# Patient Record
Sex: MALE | Race: WHITE | Employment: UNEMPLOYED | URBAN - METROPOLITAN AREA
[De-identification: names, ages, dates, MRNs, and addresses within clinical notes are randomized per-mention and may not be internally consistent; named-entity substitution may affect disease eponyms.]

---

## 2018-09-21 ENCOUNTER — OFFICE VISIT (OUTPATIENT)
Dept: FAMILY MEDICINE | Facility: CLINIC | Age: 48
End: 2018-09-21
Payer: COMMERCIAL

## 2018-09-21 ENCOUNTER — ALLIED HEALTH/NURSE VISIT (OUTPATIENT)
Dept: NURSING | Facility: CLINIC | Age: 48
End: 2018-09-21
Payer: COMMERCIAL

## 2018-09-21 VITALS
TEMPERATURE: 99.1 F | SYSTOLIC BLOOD PRESSURE: 121 MMHG | OXYGEN SATURATION: 95 % | HEART RATE: 90 BPM | DIASTOLIC BLOOD PRESSURE: 82 MMHG

## 2018-09-21 DIAGNOSIS — R50.9 FEVER, UNSPECIFIED FEVER CAUSE: ICD-10-CM

## 2018-09-21 DIAGNOSIS — R10.9 ABDOMINAL PAIN, UNSPECIFIED ABDOMINAL LOCATION: Primary | ICD-10-CM

## 2018-09-21 DIAGNOSIS — R10.12 ABDOMINAL PAIN, LEFT UPPER QUADRANT: Primary | ICD-10-CM

## 2018-09-21 PROCEDURE — 99213 OFFICE O/P EST LOW 20 MIN: CPT | Performed by: NURSE PRACTITIONER

## 2018-09-21 ASSESSMENT — PAIN SCALES - GENERAL: PAINLEVEL: MILD PAIN (3)

## 2018-09-21 NOTE — MR AVS SNAPSHOT
"              After Visit Summary   2018    Sudhir Morales    MRN: [id number]           Patient Information     Date Of Birth          1970        Visit Information        Provider Department      2018 9:20 AM Selina Brown APRN CNP [facility]        Today's Diagnoses     Abdominal pain, unspecified abdominal location    -  1    Fever, unspecified fever cause           Follow-ups after your visit        Who to contact     If you have questions or need follow up information about today's clinic visit or your schedule please contact [facility] directly at [phone].  Normal or non-critical lab and imaging results will be communicated to you by [employer], letter or phone within 4 business days after the clinic has received the results. If you do not hear from us within 7 days, please contact the clinic through [employer] or phone. If you have a critical or abnormal lab result, we will notify you by phone as soon as possible.  Submit refill requests through [employer] or call your pharmacy and they will forward the refill request to us. Please allow 3 business days for your refill to be completed.          Additional Information About Your Visit        MyChart Information     [employer] lets you send messages to your doctor, view your test results, renew your prescriptions, schedule appointments and more. To sign up, go to [email or web address] . Click on \"Log in\" on the left side of the screen, which will take you to the Welcome page. Then click on \"Sign up Now\" on the right side of the page.     You will be asked to enter the access code listed below, as well as some personal information. Please follow the directions to create your username and password.     Your access code is: [email or web address]  Expires: 2018  9:45 AM     Your access code will  in 90 days. If you need help or a new code, please call your [facility] or [phone].        Care " EveryWhere ID     This is your Care EveryWhere ID. This could be used by other organizations to access your [place] medical records  [id number]         Blood Pressure from Last 3 Encounters:   04/25/14 124/87   01/08/14 (!) 145/94   10/09/13 (!) 136/91    Weight from Last 3 Encounters:   04/25/14 (!) 313 lb (142 kg)   01/08/14 (!) 321 lb 12.8 oz (146 kg)   10/09/13 (!) 312 lb (141.5 kg)              Today, you had the following     No orders found for display       Primary Care Provider    Provider Outside       No address on file        Equal Access to Services     [facility]: Hadii aad garcia hadasho Soclarence, waaxda luqadaha, qaybta kaalmada adeegyada, stanley gan . So [facility] [phone].    ATENCIÓN: Si habla español, tiene a arora disposición servicios gratuitos de asistencia lingüística. Llame al [phone].    We comply with applicable federal civil rights laws and [place] laws. We do not discriminate on the basis of race, color, national origin, age, disability, sex, sexual orientation, or gender identity.            Thank you!     Thank you for choosing [facility]  for your care. Our goal is always to provide you with excellent care. Hearing back from our patients is one way we can continue to improve our services. Please take a few minutes to complete the written survey that you may receive in the mail after your visit with us. Thank you!             Your Updated Medication List - Protect others around you: Learn how to safely use, store and throw away your medicines at www.disposemymeds.org.          This list is accurate as of 9/21/18  9:45 AM.  Always use your most recent med list.                   Brand Name Dispense Instructions for use Diagnosis    * ACE/ARB/ARNI NOT PRESCRIBED (INTENTIONAL)      by Other route continuous prn.    Type 2 diabetes, HbA1c goal < 7% (H)       amphetamine-dextroamphetamine 30 MG per 24 hr capsule    ADDERALL XR    30  capsule    Take 1 capsule (30 mg) by mouth daily    Attention deficit disorder       * ASPIRIN NOT PRESCRIBED    INTENTIONAL    0    due to severe GI upset    Type II or unspecified type diabetes mellitus without mention of complication, not stated as uncontrolled       azelastine 0.05 % ophthalmic solution    OPTIVAR    1 Bottle    Place 1 drop into both eyes 2 times daily    Environmental allergies       azithromycin 250 MG tablet    ZITHROMAX    6 tablet    Take 2 tablets on day 1, then 1 tablet on days 2-5.    Streptococcal pharyngitis       betamethasone (augmented) 0.05 % lotion    DIPROLENE    60 mL    Apply sparingly to affected area.  Do not apply to face.    Seborrheic dermatitis of scalp       cetirizine 10 MG tablet    ZYRTEC    90 tablet    Take 1 tablet by mouth daily.    Allergic rhinitis, cause unspecified       meloxicam 15 MG tablet    MOBIC    30 tablet    Take 1 tablet (15 mg) by mouth daily    Back pain       metFORMIN 500 MG 24 hr tablet    GLUCOPHAGE-XR    360 tablet    Take 2 tablets (1,000 mg) by mouth 2 times daily (with meals)    Type 2 diabetes, HbA1c goal < 7% (H)       mometasone 50 MCG/ACT spray    NASONEX    1 Box    Spray 2 sprays into both nostrils daily    Allergic rhinitis, cause unspecified       montelukast 10 MG tablet    SINGULAIR    90 tablet    Take 1 tablet (10 mg) by mouth At Bedtime    Environmental allergies       * order for DME     300 each    LANCETS DAILY AND PRN    Type 2 diabetes, HbA1c goal < 7% (H)       * order for DME     300 each    TESTS THREE TIMES DAILY + PRN.  BRAND PER INSURANCE    Type 2 diabetes, HbA1c goal < 7% (H)       * order for DME     1 each    BRAND AS COVERED PER INSURANCE.    Type 2 diabetes, HbA1c goal < 7% (H)       sitagliptin 100 MG tablet    JANUVIA    90 tablet    Take 1 tablet (100 mg) by mouth daily    Type 2 diabetes, HbA1c goal < 7% (H)       * Notice:  This list has 5 medication(s) that are the same as other medications prescribed  for you. Read the directions carefully, and ask your doctor or other care provider to review them with you.

## 2018-09-21 NOTE — NURSING NOTE
"RN Triage:     Chief Complaint   Patient presents with     Abdominal Pain       Initial /82 (BP Location: Left arm, Patient Position: Supine, Cuff Size: Adult Large)  Pulse 90  Temp 99.1  F (37.3  C)  SpO2 95% Estimated body mass index is 39.12 kg/(m^2) as calculated from the following:    Height as of 4/25/14: 6' 3\" (1.905 m).    Weight as of 4/25/14: 313 lb (142 kg).  BP completed using cuff size: large    Assessment:  Patient reports he had colonoscopy on Wednesday which was 2 days ago. They removed a polyp. Later on in that  evening he developed cough, abdominal pain, fever, cough. Last night he was up most of the night coughing. Today he reports that he has SOB, and finds it difficult to get the breaths in a out so he feels he has chest tightness and SOB. He has not had a bowel movement since the colonoscopy. He reports that he feels over all not well. He reports abdominal pain rated 3/10 and is located in LUQ. He denies vomiting but states he has been nauseated.     Triage Dispo: Urgent to Emergent: Patient assessed to have immediate needs. Patient placed in clinic room and provider notified. immediately following nursing assessment RN huddled with provider and stated patient should be seen now and may need to go to emergency department.    Intervention: Selina Brown NP determined patient needed to go to ED.  RN escorted patient via wheel chair out to front of building and he ambulated and transferred himself into car where wife picked him up by car.       Concepcion Briones RN  "

## 2018-09-21 NOTE — MR AVS SNAPSHOT
"              After Visit Summary   2018    Sudhir Morales    MRN: [id number]           Patient Information     Date Of Birth          1970        Visit Information        Provider Department      2018 9:00 AM BK RN [facility]        Today's Diagnoses     Abdominal pain, left upper quadrant    -  1       Follow-ups after your visit        Who to contact     If you have questions or need follow up information about today's clinic visit or your schedule please contact [facility] directly at [phone].  Normal or non-critical lab and imaging results will be communicated to you by [email or web address], letter or phone within 4 business days after the clinic has received the results. If you do not hear from us within 7 days, please contact the clinic through [email or web address] or phone. If you have a critical or abnormal lab result, we will notify you by phone as soon as possible.  Submit refill requests through [employer] or call your pharmacy and they will forward the refill request to us. Please allow 3 business days for your refill to be completed.          Additional Information About Your Visit        MyChart Information     [employer] lets you send messages to your doctor, view your test results, renew your prescriptions, schedule appointments and more. To sign up, go to [email or web address] . Click on \"Log in\" on the left side of the screen, which will take you to the Welcome page. Then click on \"Sign up Now\" on the right side of the page.     You will be asked to enter the access code listed below, as well as some personal information. Please follow the directions to create your username and password.     Your access code is: [email or web address]  Expires: 2018  9:45 AM     Your access code will  in 90 days. If you need help or a new code, please call your [facility] or [phone].        Care EveryWhere ID     This is your Care EveryWhere ID. This could be used by other " organizations to access your [place] medical records  [id number]        Your Vitals Were     Pulse Temperature Pulse Oximetry             90 99.1  F (37.3  C) 95%          Blood Pressure from Last 3 Encounters:   09/21/18 121/82   04/25/14 124/87   01/08/14 (!) 145/94    Weight from Last 3 Encounters:   04/25/14 (!) 313 lb (142 kg)   01/08/14 (!) 321 lb 12.8 oz (146 kg)   10/09/13 (!) 312 lb (141.5 kg)              Today, you had the following     No orders found for display       Primary Care Provider    Provider Outside       No address on file        Equal Access to Services     [facility]: Hadii tigre Barraza, wakristin frank, qaybagustina kaalmada herb, stanley gan . So [facility] [phone].    ATENCIÓN: Si habla español, tiene a arora disposición servicios gratuitos de asistencia lingüística. Llame al [phone].    We comply with applicable federal civil rights laws and [place] laws. We do not discriminate on the basis of race, color, national origin, age, disability, sex, sexual orientation, or gender identity.            Thank you!     Thank you for choosing [facility]  for your care. Our goal is always to provide you with excellent care. Hearing back from our patients is one way we can continue to improve our services. Please take a few minutes to complete the written survey that you may receive in the mail after your visit with us. Thank you!             Your Updated Medication List - Protect others around you: Learn how to safely use, store and throw away your medicines at www.disposemymeds.org.          This list is accurate as of 9/21/18 10:00 AM.  Always use your most recent med list.                   Brand Name Dispense Instructions for use Diagnosis    * ACE/ARB/ARNI NOT PRESCRIBED (INTENTIONAL)      by Other route continuous prn.    Type 2 diabetes, HbA1c goal < 7% (H)       amphetamine-dextroamphetamine 30 MG per 24 hr capsule     ADDERALL XR    30 capsule    Take 1 capsule (30 mg) by mouth daily    Attention deficit disorder       * ASPIRIN NOT PRESCRIBED    INTENTIONAL    0    due to severe GI upset    Type II or unspecified type diabetes mellitus without mention of complication, not stated as uncontrolled       azelastine 0.05 % ophthalmic solution    OPTIVAR    1 Bottle    Place 1 drop into both eyes 2 times daily    Environmental allergies       azithromycin 250 MG tablet    ZITHROMAX    6 tablet    Take 2 tablets on day 1, then 1 tablet on days 2-5.    Streptococcal pharyngitis       betamethasone (augmented) 0.05 % lotion    DIPROLENE    60 mL    Apply sparingly to affected area.  Do not apply to face.    Seborrheic dermatitis of scalp       cetirizine 10 MG tablet    ZYRTEC    90 tablet    Take 1 tablet by mouth daily.    Allergic rhinitis, cause unspecified       meloxicam 15 MG tablet    MOBIC    30 tablet    Take 1 tablet (15 mg) by mouth daily    Back pain       metFORMIN 500 MG 24 hr tablet    GLUCOPHAGE-XR    360 tablet    Take 2 tablets (1,000 mg) by mouth 2 times daily (with meals)    Type 2 diabetes, HbA1c goal < 7% (H)       mometasone 50 MCG/ACT spray    NASONEX    1 Box    Spray 2 sprays into both nostrils daily    Allergic rhinitis, cause unspecified       montelukast 10 MG tablet    SINGULAIR    90 tablet    Take 1 tablet (10 mg) by mouth At Bedtime    Environmental allergies       * order for DME     300 each    LANCETS DAILY AND PRN    Type 2 diabetes, HbA1c goal < 7% (H)       * order for DME     300 each    TESTS THREE TIMES DAILY + PRN.  BRAND PER INSURANCE    Type 2 diabetes, HbA1c goal < 7% (H)       * order for DME     1 each    BRAND AS COVERED PER INSURANCE.    Type 2 diabetes, HbA1c goal < 7% (H)       sitagliptin 100 MG tablet    JANUVIA    90 tablet    Take 1 tablet (100 mg) by mouth daily    Type 2 diabetes, HbA1c goal < 7% (H)       * Notice:  This list has 5 medication(s) that are the same as other  medications prescribed for you. Read the directions carefully, and ask your doctor or other care provider to review them with you.

## 2018-09-21 NOTE — PROGRESS NOTES
SUBJECTIVE:   Sudhir Morales is a 48 year old male who presents to clinic today for the following health issues:      48 year old male presents with wife and young child with concerns for abdominal pain and fever x2 days. Tmax 101. He has some nausea and vomiting as well. He had a colonoscopy 2 days ago in [facility] and these symptoms started in the evening after he got home. No BM since before the colonoscopy. Decreased appetite. Headache and fatigue present. He has some low back pain. No change in bowel or bladder habits and no numbness or tingling. Also notes a cough and some mild shortness of breath. No chest pain. His primary care provider is at an outside hospital system.     Problem list and histories reviewed & adjusted, as indicated.  Additional history: as documented    Patient Active Problem List   Diagnosis     Allergic rhinitis     Family history of ischemic heart disease     High triglycerides     Overweight BMI >35     Thyroid nodule     Fatty liver     Splenomegaly     Hyperlipidemia LDL goal <100     Attention deficit disorder     JANNA (obstructive sleep apnea)     Back pain     Type 2 diabetes mellitus, uncontrolled (H)     Past Surgical History:   Procedure Laterality Date     C REPAIR OF NASAL SEPTUM  02/28/07    submucosal resection of the inferior turbinates     HC UGI ENDOSCOPY, SIMPLE EXAM  09/27/06     LITHOTRIPSY      large kidney stone     THYROID SURGERY  12/2010    lobectomy partial       Social History   Substance Use Topics     Smoking status: Former Smoker     Smokeless tobacco: Former User     Alcohol use Yes      Comment: Occassional     Family History   Problem Relation Age of Onset     C.A.D. Mother      MI     Diabetes Mother      Cancer Mother      HEART DISEASE Mother      C.A.D. Brother      MI - age 43     Diabetes Brother      Cancer Maternal Grandmother      Thyroid Disease Maternal Grandmother      Cardiovascular Maternal Grandmother      Diabetes Father      Cancer  Father      Cerebrovascular Disease Maternal Grandfather      Cancer - colorectal Paternal Grandmother      Cancer - colorectal Paternal Grandfather          Current Outpatient Prescriptions   Medication Sig Dispense Refill     ACE/ARB NOT PRESCRIBED, INTENTIONAL, by Other route continuous prn.       amphetamine-dextroamphetamine (ADDERALL XR) 30 MG per capsule Take 1 capsule (30 mg) by mouth daily 30 capsule 0     ASPIRIN NOT PRESCRIBED (INTENTIONAL) due to severe GI upset 0 0     azelastine (OPTIVAR) 0.05 % SOLN Place 1 drop into both eyes 2 times daily 1 Bottle 11     azithromycin (ZITHROMAX) 250 MG tablet Take 2 tablets on day 1, then 1 tablet on days 2-5. 6 tablet 0     betamethasone, augmented, (DIPROLENE) 0.05 % lotion Apply sparingly to affected area.  Do not apply to face. 60 mL 1     cetirizine (ZYRTEC) 10 MG tablet Take 1 tablet by mouth daily. 90 tablet 3     meloxicam (MOBIC) 15 MG tablet Take 1 tablet (15 mg) by mouth daily 30 tablet 1     metFORMIN (GLUCOPHAGE-XR) 500 MG 24 hr tablet Take 2 tablets (1,000 mg) by mouth 2 times daily (with meals) 360 tablet 3     mometasone (NASONEX) 50 MCG/ACT nasal spray Spray 2 sprays into both nostrils daily 1 Box 3     montelukast (SINGULAIR) 10 MG tablet Take 1 tablet (10 mg) by mouth At Bedtime 90 tablet 1     ORDER FOR DME LANCETS DAILY AND  each 4     ORDER FOR DME TESTS THREE TIMES DAILY + PRN.  BRAND PER INSURANCE 300 each 4     ORDER FOR DME BRAND AS COVERED PER INSURANCE. 1 each 0     sitagliptan (JANUVIA) 100 MG tablet Take 1 tablet (100 mg) by mouth daily 90 tablet 3     No Known Allergies    Reviewed and updated as needed this visit by clinical staff  Problems       Reviewed and updated as needed this visit by Provider  Problems         ROS:  Constitutional, HEENT, cardiovascular, pulmonary, gi and gu systems are negative, except as otherwise noted.    OBJECTIVE:     There were no vitals taken for this visit.  There is no height or weight on  file to calculate BMI.   See RN note for vitals as they are not flowing into my note. All WNL.    GENERAL: alert and no apparent distress. Lying on exam room table. He was pushed in wheelchair into room by RN.  ABDOMEN: generalized abdominal tenderness  PSYCH: mentation appears normal, affect normal/bright    Diagnostic Test Results:  none     ASSESSMENT/PLAN:         ICD-10-CM    1. Abdominal pain, unspecified abdominal location R10.9    2. Fever, unspecified fever cause R50.9      Symptoms severity of symptoms and that they started after colonoscopy and worsening, referred patient to emergency department for further workup. Patient prefers to go to [facility]. I did explain that if they find something abnormal, there is a chance they will have to transfer him to [facility]. Both he and his wife verbalize understanding. He is safe for private transport to the emergency department at this time. They will go directly there.    Patient verbalizes understanding and agrees with plan of care. Patient stable for discharge.      HUDSON [facility]

## 2018-11-11 ENCOUNTER — OFFICE VISIT (OUTPATIENT)
Dept: URGENT CARE | Facility: URGENT CARE | Age: 48
End: 2018-11-11
Payer: COMMERCIAL

## 2018-11-11 VITALS
WEIGHT: 285.4 LBS | DIASTOLIC BLOOD PRESSURE: 92 MMHG | HEIGHT: 75 IN | SYSTOLIC BLOOD PRESSURE: 144 MMHG | RESPIRATION RATE: 16 BRPM | HEART RATE: 72 BPM | OXYGEN SATURATION: 96 % | BODY MASS INDEX: 35.49 KG/M2 | TEMPERATURE: 97.6 F

## 2018-11-11 DIAGNOSIS — G51.0 LEFT-SIDED BELL'S PALSY: Primary | ICD-10-CM

## 2018-11-11 PROCEDURE — 99213 OFFICE O/P EST LOW 20 MIN: CPT | Performed by: FAMILY MEDICINE

## 2018-11-11 RX ORDER — VALACYCLOVIR HYDROCHLORIDE 1 G/1
1000 TABLET, FILM COATED ORAL 3 TIMES DAILY
Qty: 21 TABLET | Refills: 0 | Status: SHIPPED | OUTPATIENT
Start: 2018-11-11

## 2018-11-11 RX ORDER — PREDNISONE 20 MG/1
40 TABLET ORAL DAILY
Qty: 14 TABLET | Refills: 0 | Status: SHIPPED | OUTPATIENT
Start: 2018-11-11 | End: 2018-11-18

## 2018-11-11 ASSESSMENT — PAIN SCALES - GENERAL: PAINLEVEL: MILD PAIN (3)

## 2018-11-11 NOTE — MR AVS SNAPSHOT
After Visit Summary   11/11/2018    Sudhir Morales    MRN: [id number]           Patient Information     Date Of Birth          1970        Visit Information        Provider Department      11/11/2018 10:25 AM Jose Jorge MD [facility]        Today's Diagnoses     Left-sided Bell's palsy    -  1      Care Instructions      Bell s Palsy  Bell s palsy is a nerve disorder that usually happens suddenly and without warning. This condition happens when a nerve that controls facial movement is swollen, inflamed, or compressed. Nerve damage can happen for many reasons. But most cases of Bell s palsy are probably caused by a virus.  Symptoms of Bell s palsy  Here are signs of the disorder:     Weakness, twitching, or total paralysis of one side of your face (in rare cases, on both sides)    Drooping of the eyelid and mouth, drooling on one side of mouth    Trouble closing one eye completely, and excessive tearing    Noises seeming louder than usual    Ringing in one or both ears    Change in your sense of taste  When to go to the emergency room (ER)  There are conditions, such as stroke, that may look like Bell's palsy and are medical emergencies. Therefore, you should seek emergent medical care if you notice facial weakness or drooping. Although Bell s palsy can be alarming, it s rarely serious. Many people begin to improve in about 2 weeks, even without treatment. It is important to be seen as soon as possible. Most research shows that treatment is best when received within the first few days of symptoms.   Treatment  To treat Bell s palsy, you may be given steroid medicines. This helps reduce swelling of the affected nerve. In some cases, your healthcare provider may prescribe an antiviral medicine. Your open eye may be covered with a patch to prevent it from drying out. You also may need to use eye drops and ointments for a time. Physical therapy, facial massage, or acupuncture  may also be prescribed. Your healthcare provider will discuss follow-up care with you, including the possible need for further treatment to help your facial muscles return to normal.  Date Last Reviewed: 3/1/2018    4491-4412 [employer]. [address] [place] [address], [place] [address]. All rights reserved. This information is not intended as a substitute for professional medical care. Always follow your healthcare professional's instructions.        Cotto s Palsy    Bell's Palsy is a problem involving the nerve that controls the muscles on one side of the face.  In most cases, the cause is unknown, but may be related to inflammation of the nerve, diabetes, pregnancy, Lyme disease, and viral infections such as herpes or varicella. Symptoms usually appear only on one side. They may include:    Inability to close the eyelid    Tearing of the eye    Facial drooping    Drooling    Numbness or pain    Changes in taste    Sound sensitivity  Damage to the eye can be a serious problem. The inability to blink can cause the eye to dry out. An ulcer (sore) can then form on the cornea. Also, not blinking means that the eye has no protection from dirt and dust particles.  Treatment involves protecting and moistening the eye. Medicines, such as steroids, may also help.  Most people recover fully within 3 to 6 months. However, the condition sometimes returns months or years later.  Home care    Get plenty of rest and eat a healthy diet to help yourself recover.    Use artificial tears often during the day and at bedtime to prevent drying. These drops are available without prescription at your drug store.    Wear protective glasses especially when outside to protect from flying debris. Use sunglasses when outdoors.    Tape the eyelid closed at bedtime with a paper tape (available at your pharmacy). It has a very mild adhesive so won't injure the lid. This will protect your eye from injury while you sleep.    Sometimes  medicines are prescribed to reduce inflammation or treat specific viral infections of the nerve. If medicines are prescribed, take them exactly as directed. Usually the sooner the medicines are started, the more effective they are. Taking this medicine as prescribed will help with a full recovery.    Use low heat, for example from a heating pad, on the affected area. This can help reduce pain and swelling.    If you have severe pain, contact your healthcare provider.  Follow-up care  Follow up with your healthcare provider as advised. If you referred to a specialist, make that appointment promptly.  When to seek medical advice  Call your healthcare provider if any of the following occur:    Severe eye redness    Eye pain    Thick drainage from the eye    Change in vision (such as double vision or losing vision)    Fever over 100.4 F (38 C) or as directed by your healthcare provider    Headache, neck pain, weakness, trouble speaking or walking, or other unexplained symptoms  Date Last Reviewed: 3/1/2018    9738-1608 [employer]. [address] [place] [address]. All rights reserved. This information is not intended as a substitute for professional medical care. Always follow your healthcare professional's instructions.                Follow-ups after your visit        Additional Services     NEUROLOGY ADULT REFERRAL       Your provider has referred you for the following:   Consult at [facility]: Neurology Clinic - [place] [phone]   [email or web address]     Please be aware that coverage of these services is subject to the terms and limitations of your health insurance plan.  Call member services at your health plan with any benefit or coverage questions.      Please bring the following with you to your appointment:    (1) Any X-Rays, CTs or MRIs which have been performed.  Contact the facility where they were done to arrange for  prior to your  "scheduled appointment.    (2) List of current medications  (3) This referral request   (4) Any documents/labs given to you for this referral                  Who to contact     If you have questions or need follow up information about today's clinic visit or your schedule please contact [facility] directly at [phone].  Normal or non-critical lab and imaging results will be communicated to you by MyChart, letter or phone within 4 business days after the clinic has received the results. If you do not hear from us within 7 days, please contact the clinic through [employer] or phone. If you have a critical or abnormal lab result, we will notify you by phone as soon as possible.  Submit refill requests through [employer] or call your pharmacy and they will forward the refill request to us. Please allow 3 business days for your refill to be completed.          Additional Information About Your Visit        MyChart Information     [employer] lets you send messages to your doctor, view your test results, renew your prescriptions, schedule appointments and more. To sign up, go to [email or web address] . Click on \"Log in\" on the left side of the screen, which will take you to the Welcome page. Then click on \"Sign up Now\" on the right side of the page.     You will be asked to enter the access code listed below, as well as some personal information. Please follow the directions to create your username and password.     Your access code is: [email or web address]  Expires: 2018  8:45 AM     Your access code will  in 90 days. If you need help or a new code, please call your [facility] or [phone].        Care EveryWhere ID     This is your Care EveryWhere ID. This could be used by other organizations to access your [facility] medical records  [id number]        Your Vitals Were     Pulse Temperature Respirations Height Pulse Oximetry BMI (Body Mass Index)    72 97.6  F (36.4  C) (Oral) 16 6' 3\" (1.905 " m) 96% 35.67 kg/m2       Blood Pressure from Last 3 Encounters:   11/11/18 (!) 144/92   09/21/18 121/82   04/25/14 124/87    Weight from Last 3 Encounters:   11/11/18 285 lb 6.4 oz (129.5 kg)   04/25/14 (!) 313 lb (142 kg)   01/08/14 (!) 321 lb 12.8 oz (146 kg)              We Performed the Following     NEUROLOGY ADULT REFERRAL          Today's Medication Changes          These changes are accurate as of 11/11/18 11:34 AM.  If you have any questions, ask your nurse or doctor.               Start taking these medicines.        Dose/Directions    predniSONE 20 MG tablet   Commonly known as:  DELTASONE   Used for:  Left-sided Bell's palsy   Started by:  Jose Jorge MD        Dose:  40 mg   Take 2 tablets (40 mg) by mouth daily for 7 days   Quantity:  14 tablet   Refills:  0       valACYclovir 1000 mg tablet   Commonly known as:  VALTREX   Used for:  Left-sided Bell's palsy   Started by:  Jose Jorge MD        Dose:  1000 mg   Take 1 tablet (1,000 mg) by mouth 3 times daily   Quantity:  21 tablet   Refills:  0            Where to get your medicines      These medications were sent to [facility] [address] [place] [address] - [address] AT [facility] [address], [facility] [address]     Phone:  [phone]     predniSONE 20 MG tablet    valACYclovir 1000 mg tablet                Primary Care Provider    Provider Outside       No address on file        Equal Access to Services     SUZIE AVILA AH: Hadii tigre ku hadasho Sochristopherali, waaxda luqadaha, qaybta kaalmada adeegyada, waxay long jean-baptiste. So [facility] [phone].    ATENCIÓN: Si habla terriañol, tiene a arora disposición servicios gratuitos de asistencia lingüística. Llame al [phone].    We comply with applicable federal civil rights laws and [place] laws. We do not discriminate on the basis of race, color, national origin, age, disability, sex, sexual orientation, or gender identity.             Thank you!     Thank you for choosing [facility]  for your care. Our goal is always to provide you with excellent care. Hearing back from our patients is one way we can continue to improve our services. Please take a few minutes to complete the written survey that you may receive in the mail after your visit with us. Thank you!             Your Updated Medication List - Protect others around you: Learn how to safely use, store and throw away your medicines at www.disposemymeds.org.          This list is accurate as of 11/11/18 11:34 AM.  Always use your most recent med list.                   Brand Name Dispense Instructions for use Diagnosis    * ACE/ARB/ARNI NOT PRESCRIBED (INTENTIONAL)      by Other route continuous prn.    Type 2 diabetes, HbA1c goal < 7% (H)       amphetamine-dextroamphetamine 30 MG per 24 hr capsule    ADDERALL XR    30 capsule    Take 1 capsule (30 mg) by mouth daily    Attention deficit disorder       * ASPIRIN NOT PRESCRIBED    INTENTIONAL    0    due to severe GI upset    Type II or unspecified type diabetes mellitus without mention of complication, not stated as uncontrolled       azelastine 0.05 % ophthalmic solution    OPTIVAR    1 Bottle    Place 1 drop into both eyes 2 times daily    Environmental allergies       betamethasone (augmented) 0.05 % lotion    DIPROLENE    60 mL    Apply sparingly to affected area.  Do not apply to face.    Seborrheic dermatitis of scalp       cetirizine 10 MG tablet    ZYRTEC    90 tablet    Take 1 tablet by mouth daily.    Allergic rhinitis, cause unspecified       meloxicam 15 MG tablet    MOBIC    30 tablet    Take 1 tablet (15 mg) by mouth daily    Back pain       metFORMIN 500 MG 24 hr tablet    GLUCOPHAGE-XR    360 tablet    Take 2 tablets (1,000 mg) by mouth 2 times daily (with meals)    Type 2 diabetes, HbA1c goal < 7% (H)       mometasone 50 MCG/ACT spray    NASONEX    1 Box    Spray 2 sprays into both nostrils daily     Allergic rhinitis, cause unspecified       montelukast 10 MG tablet    SINGULAIR    90 tablet    Take 1 tablet (10 mg) by mouth At Bedtime    Environmental allergies       * order for DME     300 each    LANCETS DAILY AND PRN    Type 2 diabetes, HbA1c goal < 7% (H)       * order for DME     300 each    TESTS THREE TIMES DAILY + PRN.  BRAND PER INSURANCE    Type 2 diabetes, HbA1c goal < 7% (H)       * order for DME     1 each    BRAND AS COVERED PER INSURANCE.    Type 2 diabetes, HbA1c goal < 7% (H)       predniSONE 20 MG tablet    DELTASONE    14 tablet    Take 2 tablets (40 mg) by mouth daily for 7 days    Left-sided Bell's palsy       sitagliptin 100 MG tablet    JANUVIA    90 tablet    Take 1 tablet (100 mg) by mouth daily    Type 2 diabetes, HbA1c goal < 7% (H)       valACYclovir 1000 mg tablet    VALTREX    21 tablet    Take 1 tablet (1,000 mg) by mouth 3 times daily    Left-sided Bell's palsy       * Notice:  This list has 5 medication(s) that are the same as other medications prescribed for you. Read the directions carefully, and ask your doctor or other care provider to review them with you.

## 2018-11-11 NOTE — PROGRESS NOTES
SUBJECTIVE:   Sudhir Morales is a 48 year old male who presents to clinic today for the following health issues:    Chief Complaint   Patient presents with     Eye Problem       Duration: 1 day     Description (location/character/radiation): left eye red, drainage     Intensity:  moderate    Accompanying signs and symptoms: none     History (similar episodes/previous evaluation): None    Precipitating or alleviating factors: None    Therapies tried and outcome: eye wash        Problem list and histories reviewed & adjusted, as indicated.  Additional history: as documented    Patient Active Problem List   Diagnosis     Allergic rhinitis     Family history of ischemic heart disease     High triglycerides     Overweight BMI >35     Thyroid nodule     Fatty liver     Splenomegaly     Hyperlipidemia LDL goal <100     Attention deficit disorder     JANNA (obstructive sleep apnea)     Back pain     Type 2 diabetes mellitus, uncontrolled (H)     Past Surgical History:   Procedure Laterality Date      REPAIR OF NASAL SEPTUM  02/28/07    submucosal resection of the inferior turbinates      UGI ENDOSCOPY, SIMPLE EXAM  09/27/06     LITHOTRIPSY      large kidney stone     THYROID SURGERY  12/2010    lobectomy partial       Social History   Substance Use Topics     Smoking status: Former Smoker     Smokeless tobacco: Former User     Alcohol use Yes      Comment: Occassional     Family History   Problem Relation Age of Onset     C.A.D. Mother      MI     Diabetes Mother      Cancer Mother      HEART DISEASE Mother      C.A.D. Brother      MI - age 43     Diabetes Brother      Cancer Maternal Grandmother      Thyroid Disease Maternal Grandmother      Cardiovascular Maternal Grandmother      Diabetes Father      Cancer Father      Cerebrovascular Disease Maternal Grandfather      Cancer - colorectal Paternal Grandmother      Cancer - colorectal Paternal Grandfather          Current Outpatient Prescriptions   Medication Sig  Dispense Refill     ACE/ARB NOT PRESCRIBED, INTENTIONAL, by Other route continuous prn.       amphetamine-dextroamphetamine (ADDERALL XR) 30 MG per capsule Take 1 capsule (30 mg) by mouth daily 30 capsule 0     ASPIRIN NOT PRESCRIBED (INTENTIONAL) due to severe GI upset 0 0     azelastine (OPTIVAR) 0.05 % SOLN Place 1 drop into both eyes 2 times daily 1 Bottle 11     betamethasone, augmented, (DIPROLENE) 0.05 % lotion Apply sparingly to affected area.  Do not apply to face. 60 mL 1     cetirizine (ZYRTEC) 10 MG tablet Take 1 tablet by mouth daily. 90 tablet 3     meloxicam (MOBIC) 15 MG tablet Take 1 tablet (15 mg) by mouth daily 30 tablet 1     metFORMIN (GLUCOPHAGE-XR) 500 MG 24 hr tablet Take 2 tablets (1,000 mg) by mouth 2 times daily (with meals) 360 tablet 3     mometasone (NASONEX) 50 MCG/ACT nasal spray Spray 2 sprays into both nostrils daily 1 Box 3     montelukast (SINGULAIR) 10 MG tablet Take 1 tablet (10 mg) by mouth At Bedtime 90 tablet 1     ORDER FOR DME LANCETS DAILY AND  each 4     ORDER FOR DME TESTS THREE TIMES DAILY + PRN.  BRAND PER INSURANCE 300 each 4     ORDER FOR DME BRAND AS COVERED PER INSURANCE. 1 each 0     sitagliptan (JANUVIA) 100 MG tablet Take 1 tablet (100 mg) by mouth daily 90 tablet 3     No Known Allergies  Recent Labs   Lab Test  04/25/14   1239  10/09/13   1513  06/13/13   1230  02/12/13   1115  05/01/12   0932   01/18/11   0724  12/11/10   0623   10/26/10   1045   A1C  9.4*  7.3*  9.7*  9.3*  7.3*   < >   --    --    --   8.0*   LDL  Cannot estimate LDL when triglyceride exceeds 400 mg/dL  78   --    --   Cannot estimate LDL when triglyceride exceeds 400 mg/dL  Cannot estimate LDL when triglyceride exceeds 400 mg/dL   --   90   --    < >   --    HDL  30*   --    --   37*  32*   --   37*   --    < >   --    TRIG  455*   --    --   545*  540*   --   187*   --    < >   --    ALT  91*   --    --    --   40   --   41   --    --   102*   CR  1.07   --    --   0.96  1.23   <  ">   --   1.20   --    --    GFRESTIMATED  75   --    --   86  65   < >   --   67   --    --    GFRESTBLACK  >90   --    --   >90  78   < >   --   81   --    --    POTASSIUM  4.6   --    --    --    --    --    --   4.1   --    --    TSH   --    --    --   2.30   --    --    --    --    --   3.39    < > = values in this interval not displayed.      BP Readings from Last 3 Encounters:   11/11/18 (!) 144/92   09/21/18 121/82   04/25/14 124/87    Wt Readings from Last 3 Encounters:   11/11/18 285 lb 6.4 oz (129.5 kg)   04/25/14 (!) 313 lb (142 kg)   01/08/14 (!) 321 lb 12.8 oz (146 kg)                  Labs reviewed in EPIC    Reviewed and updated as needed this visit by clinical staff  Tobacco  Allergies  Meds  Med Hx  Surg Hx  Fam Hx  Soc Hx      Reviewed and updated as needed this visit by Provider         ROS:  Constitutional, HEENT, cardiovascular, pulmonary, GI, , musculoskeletal, neuro, skin, endocrine and psych systems are negative, except as otherwise noted.    OBJECTIVE:     BP (!) 144/92 (BP Location: Right arm, Patient Position: Sitting, Cuff Size: Adult Large)  Pulse 72  Temp 97.6  F (36.4  C) (Oral)  Resp 16  Ht 6' 3\" (1.905 m)  Wt 285 lb 6.4 oz (129.5 kg)  SpO2 96%  BMI 35.67 kg/m2  Body mass index is 35.67 kg/(m^2).  GENERAL: alert, no distress and obese  EYES: unable to close left eye completely, pupil equal and reactive to light and accommodation, no conjunctival injection or drainage noted  HENT: ear canals and TM's normal, nose and mouth without ulcers or lesions  NECK: no adenopathy, no asymmetry, masses, or scars and thyroid normal to palpation  RESP: lungs clear to auscultation - no rales, rhonchi or wheezes  CV: regular rate and rhythm, normal S1 S2, no S3 or S4, no murmur, click or rub, no peripheral edema and peripheral pulses strong  ABDOMEN: soft, nontender, no hepatosplenomegaly, no masses and bowel sounds normal  MS: no gross musculoskeletal defects noted, no edema  SKIN: no " suspicious lesions or rashes  NEURO: subtle left sided facial drop/loss of nasolabial fold, loss of left sided forehead wrinkle, normal upper and lower extremity strength, reflexes 2+, sensation to touch impression intact    ASSESSMENT/PLAN:         ICD-10-CM    1. Left-sided Bell's palsy G51.0 NEUROLOGY ADULT REFERRAL     valACYclovir (VALTREX) 1000 mg tablet     predniSONE (DELTASONE) 20 MG tablet     Suspect presentation secondary to left-sided Bell's palsy, differentials and pathogenesis discussed in detail.  Valacyclovir and prednisone prescribed, common side effects discussed.  Suggested lubricating ophthalmic drops.  Neurology referral placed for further review and recommendations.  Instructed to go ER if symptoms worsen.  Patient understood and in agreement with above plan.  All questions answered.        Patient Instructions     Bell s Palsy  Bell s palsy is a nerve disorder that usually happens suddenly and without warning. This condition happens when a nerve that controls facial movement is swollen, inflamed, or compressed. Nerve damage can happen for many reasons. But most cases of Bell s palsy are probably caused by a virus.  Symptoms of Bell s palsy  Here are signs of the disorder:     Weakness, twitching, or total paralysis of one side of your face (in rare cases, on both sides)    Drooping of the eyelid and mouth, drooling on one side of mouth    Trouble closing one eye completely, and excessive tearing    Noises seeming louder than usual    Ringing in one or both ears    Change in your sense of taste  When to go to the emergency room (ER)  There are conditions, such as stroke, that may look like Bell's palsy and are medical emergencies. Therefore, you should seek emergent medical care if you notice facial weakness or drooping. Although Bell s palsy can be alarming, it s rarely serious. Many people begin to improve in about 2 weeks, even without treatment. It is important to be seen as soon as  possible. Most research shows that treatment is best when received within the first few days of symptoms.   Treatment  To treat Bell s palsy, you may be given steroid medicines. This helps reduce swelling of the affected nerve. In some cases, your healthcare provider may prescribe an antiviral medicine. Your open eye may be covered with a patch to prevent it from drying out. You also may need to use eye drops and ointments for a time. Physical therapy, facial massage, or acupuncture may also be prescribed. Your healthcare provider will discuss follow-up care with you, including the possible need for further treatment to help your facial muscles return to normal.  Date Last Reviewed: 3/1/2018    9812-4666 [employer]. [address] [place] [address], [place] [address]. All rights reserved. This information is not intended as a substitute for professional medical care. Always follow your healthcare professional's instructions.        Cotto s Palsy    Bell's Palsy is a problem involving the nerve that controls the muscles on one side of the face.  In most cases, the cause is unknown, but may be related to inflammation of the nerve, diabetes, pregnancy, Lyme disease, and viral infections such as herpes or varicella. Symptoms usually appear only on one side. They may include:    Inability to close the eyelid    Tearing of the eye    Facial drooping    Drooling    Numbness or pain    Changes in taste    Sound sensitivity  Damage to the eye can be a serious problem. The inability to blink can cause the eye to dry out. An ulcer (sore) can then form on the cornea. Also, not blinking means that the eye has no protection from dirt and dust particles.  Treatment involves protecting and moistening the eye. Medicines, such as steroids, may also help.  Most people recover fully within 3 to 6 months. However, the condition sometimes returns months or years later.  Home care    Get plenty of rest and eat a healthy diet to help  yourself recover.    Use artificial tears often during the day and at bedtime to prevent drying. These drops are available without prescription at your drug store.    Wear protective glasses especially when outside to protect from flying debris. Use sunglasses when outdoors.    Tape the eyelid closed at bedtime with a paper tape (available at your pharmacy). It has a very mild adhesive so won't injure the lid. This will protect your eye from injury while you sleep.    Sometimes medicines are prescribed to reduce inflammation or treat specific viral infections of the nerve. If medicines are prescribed, take them exactly as directed. Usually the sooner the medicines are started, the more effective they are. Taking this medicine as prescribed will help with a full recovery.    Use low heat, for example from a heating pad, on the affected area. This can help reduce pain and swelling.    If you have severe pain, contact your healthcare provider.  Follow-up care  Follow up with your healthcare provider as advised. If you referred to a specialist, make that appointment promptly.  When to seek medical advice  Call your healthcare provider if any of the following occur:    Severe eye redness    Eye pain    Thick drainage from the eye    Change in vision (such as double vision or losing vision)    Fever over 100.4 F (38 C) or as directed by your healthcare provider    Headache, neck pain, weakness, trouble speaking or walking, or other unexplained symptoms  Date Last Reviewed: 3/1/2018    2690-6746 [employer]. [address] [place] [address]. All rights reserved. This information is not intended as a substitute for professional medical care. Always follow your healthcare professional's instructions.            oJse Jorge MD  [facility]

## 2018-11-11 NOTE — PATIENT INSTRUCTIONS
Bell s Palsy  Bell s palsy is a nerve disorder that usually happens suddenly and without warning. This condition happens when a nerve that controls facial movement is swollen, inflamed, or compressed. Nerve damage can happen for many reasons. But most cases of Bell s palsy are probably caused by a virus.  Symptoms of Bell s palsy  Here are signs of the disorder:     Weakness, twitching, or total paralysis of one side of your face (in rare cases, on both sides)    Drooping of the eyelid and mouth, drooling on one side of mouth    Trouble closing one eye completely, and excessive tearing    Noises seeming louder than usual    Ringing in one or both ears    Change in your sense of taste  When to go to the emergency room (ER)  There are conditions, such as stroke, that may look like Bell's palsy and are medical emergencies. Therefore, you should seek emergent medical care if you notice facial weakness or drooping. Although Bell s palsy can be alarming, it s rarely serious. Many people begin to improve in about 2 weeks, even without treatment. It is important to be seen as soon as possible. Most research shows that treatment is best when received within the first few days of symptoms.   Treatment  To treat Bell s palsy, you may be given steroid medicines. This helps reduce swelling of the affected nerve. In some cases, your healthcare provider may prescribe an antiviral medicine. Your open eye may be covered with a patch to prevent it from drying out. You also may need to use eye drops and ointments for a time. Physical therapy, facial massage, or acupuncture may also be prescribed. Your healthcare provider will discuss follow-up care with you, including the possible need for further treatment to help your facial muscles return to normal.  Date Last Reviewed: 3/1/2018    4308-9719 [employer]. [address] [place] [address], [place] [address]. All rights reserved. This information is not intended as a substitute  for professional medical care. Always follow your healthcare professional's instructions.        Cotto s Palsy    Bell's Palsy is a problem involving the nerve that controls the muscles on one side of the face.  In most cases, the cause is unknown, but may be related to inflammation of the nerve, diabetes, pregnancy, Lyme disease, and viral infections such as herpes or varicella. Symptoms usually appear only on one side. They may include:    Inability to close the eyelid    Tearing of the eye    Facial drooping    Drooling    Numbness or pain    Changes in taste    Sound sensitivity  Damage to the eye can be a serious problem. The inability to blink can cause the eye to dry out. An ulcer (sore) can then form on the cornea. Also, not blinking means that the eye has no protection from dirt and dust particles.  Treatment involves protecting and moistening the eye. Medicines, such as steroids, may also help.  Most people recover fully within 3 to 6 months. However, the condition sometimes returns months or years later.  Home care    Get plenty of rest and eat a healthy diet to help yourself recover.    Use artificial tears often during the day and at bedtime to prevent drying. These drops are available without prescription at your drug store.    Wear protective glasses especially when outside to protect from flying debris. Use sunglasses when outdoors.    Tape the eyelid closed at bedtime with a paper tape (available at your pharmacy). It has a very mild adhesive so won't injure the lid. This will protect your eye from injury while you sleep.    Sometimes medicines are prescribed to reduce inflammation or treat specific viral infections of the nerve. If medicines are prescribed, take them exactly as directed. Usually the sooner the medicines are started, the more effective they are. Taking this medicine as prescribed will help with a full recovery.    Use low heat, for example from a heating pad, on the affected area.  This can help reduce pain and swelling.    If you have severe pain, contact your healthcare provider.  Follow-up care  Follow up with your healthcare provider as advised. If you referred to a specialist, make that appointment promptly.  When to seek medical advice  Call your healthcare provider if any of the following occur:    Severe eye redness    Eye pain    Thick drainage from the eye    Change in vision (such as double vision or losing vision)    Fever over 100.4 F (38 C) or as directed by your healthcare provider    Headache, neck pain, weakness, trouble speaking or walking, or other unexplained symptoms  Date Last Reviewed: 3/1/2018    [id number] [employer]. [address] [place] [address], [place] [address]. All rights reserved. This information is not intended as a substitute for professional medical care. Always follow your healthcare professional's instructions.

## 2018-11-13 ENCOUNTER — TRANSFERRED RECORDS (OUTPATIENT)
Dept: HEALTH INFORMATION MANAGEMENT | Facility: CLINIC | Age: 48
End: 2018-11-13

## 2020-08-24 ENCOUNTER — OFFICE VISIT (OUTPATIENT)
Dept: URGENT CARE | Facility: URGENT CARE | Age: 50
End: 2020-08-24
Payer: COMMERCIAL

## 2020-08-24 VITALS
SYSTOLIC BLOOD PRESSURE: 174 MMHG | WEIGHT: 299.4 LBS | HEART RATE: 77 BPM | TEMPERATURE: 98.4 F | DIASTOLIC BLOOD PRESSURE: 96 MMHG | OXYGEN SATURATION: 98 % | BODY MASS INDEX: 37.42 KG/M2 | RESPIRATION RATE: 12 BRPM

## 2020-08-24 DIAGNOSIS — S01.111A LACERATION OF RIGHT EYEBROW, INITIAL ENCOUNTER: Primary | ICD-10-CM

## 2020-08-24 PROCEDURE — 90471 IMMUNIZATION ADMIN: CPT | Performed by: FAMILY MEDICINE

## 2020-08-24 PROCEDURE — 12011 RPR F/E/E/N/L/M 2.5 CM/<: CPT | Performed by: FAMILY MEDICINE

## 2020-08-24 PROCEDURE — 90714 TD VACC NO PRESV 7 YRS+ IM: CPT | Performed by: FAMILY MEDICINE

## 2020-08-24 RX ORDER — INSULIN GLARGINE 100 [IU]/ML
INJECTION, SOLUTION SUBCUTANEOUS
COMMUNITY
Start: 2020-08-24

## 2020-08-24 RX ORDER — FLUTICASONE PROPIONATE 50 MCG
2 SPRAY, SUSPENSION (ML) NASAL
COMMUNITY
Start: 2020-06-11

## 2020-08-24 RX ORDER — IBUPROFEN 200 MG
400 TABLET ORAL
COMMUNITY
Start: 2019-11-01

## 2020-08-24 RX ORDER — GABAPENTIN 300 MG/1
900 CAPSULE ORAL
COMMUNITY
Start: 2020-01-17

## 2020-08-24 RX ORDER — METHOCARBAMOL 750 MG/1
TABLET, FILM COATED ORAL
COMMUNITY
Start: 2020-08-10

## 2020-08-24 RX ORDER — TRAMADOL HYDROCHLORIDE 50 MG/1
50 TABLET ORAL
COMMUNITY
Start: 2020-08-10

## 2020-08-24 NOTE — NURSING NOTE
Prior to immunization administration, verified patients identity using patient s name and date of birth. Please see Immunization Activity for additional information.     Screening Questionnaire for Adult Immunization    Are you sick today?   No   Do you have allergies to medications, food, a vaccine component or latex?   No   Have you ever had a serious reaction after receiving a vaccination?   No   Do you have a long-term health problem with heart, lung, kidney, or metabolic disease (e.g., diabetes), asthma, a blood disorder, no spleen, complement component deficiency, a cochlear implant, or a spinal fluid leak?  Are you on long-term aspirin therapy?   No   Do you have cancer, leukemia, HIV/AIDS, or any other immune system problem?   No   Do you have a parent, brother, or sister with an immune system problem?   No   In the past 3 months, have you taken medications that affect  your immune system, such as prednisone, other steroids, or anticancer drugs; drugs for the treatment of rheumatoid arthritis, Crohn s disease, or psoriasis; or have you had radiation treatments?   No   Have you had a seizure, or a brain or other nervous system problem?   No   During the past year, have you received a transfusion of blood or blood    products, or been given immune (gamma) globulin or antiviral drug?   No   For women: Are you pregnant or is there a chance you could become       pregnant during the next month?      Have you received any vaccinations in the past 4 weeks?   No     Immunization questionnaire answers were all negative.        Per orders of Dr. Henson, injection of TD given by Stone Bhakta CMA. Patient instructed to remain in clinic for 15 minutes afterwards, and to report any adverse reaction to me immediately.       Screening performed by Stone Bhakta CMA on 8/24/2020 at 4:15 PM.

## 2020-08-24 NOTE — PROGRESS NOTES
"SUBJECTIVE:   Sudhir Morales is a 50 year old male presenting with a chief complaint of   Chief Complaint   Patient presents with     Laceration     above right eye about 0.5 hour ago      Right periorbital laceration within the hour.  Apparently while playing with his 3-year-old's son \"head butted him \"and he sustained a laceration to his right periorbital area or eyebrow area.  Denies any other associated injuries or concern    Review of Systems    Past Medical History:   Diagnosis Date     Acute peptic ulcer, unspecified site, with hemorrhage, without mention of obstruction 1989    tx with Zantac     Acute prostatitis 1/12/2007     Chest pain 2/3/2010     Fatty liver 11/2010    See US     Kidney stone 2010     LUMBOSACRAL NEURITIS NOS 10/12/2007     Splenomegaly 11/2010    14 cm     Thyroid nodule 10/2010    See US     Type II or unspecified type diabetes mellitus without mention of complication, not stated as uncontrolled 5/4/2006     Family History   Problem Relation Age of Onset     C.A.D. Mother         MI     Diabetes Mother      Cancer Mother      Heart Disease Mother      C.A.D. Brother         MI - age 43     Diabetes Brother      Cancer Maternal Grandmother      Thyroid Disease Maternal Grandmother      Cardiovascular Maternal Grandmother      Diabetes Father      Cancer Father      Cerebrovascular Disease Maternal Grandfather      Cancer - colorectal Paternal Grandmother      Cancer - colorectal Paternal Grandfather      Current Outpatient Medications   Medication Sig Dispense Refill     amphetamine-dextroamphetamine (ADDERALL XR) 30 MG per capsule Take 1 capsule (30 mg) by mouth daily 30 capsule 0     azelastine (OPTIVAR) 0.05 % SOLN Place 1 drop into both eyes 2 times daily 1 Bottle 11     betamethasone, augmented, (DIPROLENE) 0.05 % lotion Apply sparingly to affected area.  Do not apply to face. 60 mL 1     cetirizine (ZYRTEC) 10 MG tablet Take 1 tablet by mouth daily. 90 tablet 3     fluticasone " (FLONASE) 50 MCG/ACT nasal spray 2 sprays       gabapentin (NEURONTIN) 300 MG capsule Take 900 mg by mouth       ibuprofen (ADVIL/MOTRIN) 200 MG tablet Take 400 mg by mouth       insulin glargine (LANTUS PEN) 100 UNIT/ML pen Inject 80 Units Subcutaneous       LANTUS SOLOSTAR 100 UNIT/ML soln        meloxicam (MOBIC) 15 MG tablet Take 1 tablet (15 mg) by mouth daily 30 tablet 1     metFORMIN (GLUCOPHAGE-XR) 500 MG 24 hr tablet Take 2 tablets (1,000 mg) by mouth 2 times daily (with meals) 360 tablet 3     methocarbamol (ROBAXIN) 750 MG tablet TAKE 1 TABLET BY MOUTH FOUR TIMES DAILY       mometasone (NASONEX) 50 MCG/ACT nasal spray Spray 2 sprays into both nostrils daily 1 Box 3     montelukast (SINGULAIR) 10 MG tablet Take 1 tablet (10 mg) by mouth At Bedtime 90 tablet 1     sitagliptan (JANUVIA) 100 MG tablet Take 1 tablet (100 mg) by mouth daily 90 tablet 3     traMADol (ULTRAM) 50 MG tablet Take 50 mg by mouth       valACYclovir (VALTREX) 1000 mg tablet Take 1 tablet (1,000 mg) by mouth 3 times daily 21 tablet 0     ACE/ARB NOT PRESCRIBED, INTENTIONAL, by Other route continuous prn.       ASPIRIN NOT PRESCRIBED (INTENTIONAL) due to severe GI upset 0 0     ORDER FOR DME LANCETS DAILY AND  each 4     ORDER FOR DME TESTS THREE TIMES DAILY + PRN.  BRAND PER INSURANCE 300 each 4     ORDER FOR DME BRAND AS COVERED PER INSURANCE. 1 each 0     Social History     Tobacco Use     Smoking status: Former Smoker     Smokeless tobacco: Former User   Substance Use Topics     Alcohol use: Yes     Comment: Occassional       OBJECTIVE  BP (!) 174/96 (BP Location: Left arm, Patient Position: Sitting, Cuff Size: Adult Large)   Pulse 77   Temp 98.4  F (36.9  C) (Oral)   Resp 12   Wt 135.8 kg (299 lb 6.4 oz)   SpO2 98%   BMI 37.42 kg/m      Physical Exam  Neck:      Musculoskeletal: Normal range of motion.   Cardiovascular:      Rate and Rhythm: Normal rate.      Pulses: Normal pulses.   Musculoskeletal: Normal range of  motion.   Skin:     Capillary Refill: Capillary refill takes less than 2 seconds.      Comments: Small laceration to the substance of the eyebrow on the right superior orbit approximately 2 cm in length near with minimal gapping to mild gapping of the wound edges   Neurological:      General: No focal deficit present.      Mental Status: He is alert.         ASSESSMENT:    ICD-10-CM    1. Facial laceration  S01.81XA TD PRESERV FREE, IM (7+ YRS)     ADMIN 1st VACCINE   2. Laceration of right eyebrow, initial encounter  S01.111A REPAIR SUPERFICIAL, WOUND FACE/EAR <2.5 CM        Topical EMLA cream was applied after the area was cleaned in the usual sterile fashion 1.5 cc 1% plain lidocaine was instilled at the wound margin with good anesthesia.  3 sutures were placed six-point 0 nylon was utilized  Suture removal recommended 6 to 7 days at his discretion signs of infection including increasing spreading redness pain or discharge at the wound site was described return to see us immediately any signs infection develop.  He did mention he may have his wife take the stitches out in 6 to 7 days time.  Emile Henson MD